# Patient Record
Sex: FEMALE | Race: WHITE | NOT HISPANIC OR LATINO | Employment: STUDENT | URBAN - METROPOLITAN AREA
[De-identification: names, ages, dates, MRNs, and addresses within clinical notes are randomized per-mention and may not be internally consistent; named-entity substitution may affect disease eponyms.]

---

## 2022-11-30 ENCOUNTER — TRANSCRIBE ORDERS (OUTPATIENT)
Dept: ADMINISTRATIVE | Facility: HOSPITAL | Age: 42
End: 2022-11-30

## 2022-11-30 DIAGNOSIS — Z12.31 VISIT FOR SCREENING MAMMOGRAM: Primary | ICD-10-CM

## 2023-01-17 ENCOUNTER — HOSPITAL ENCOUNTER (OUTPATIENT)
Dept: MAMMOGRAPHY | Facility: HOSPITAL | Age: 43
Discharge: HOME | End: 2023-01-17
Admitting: INTERNAL MEDICINE
Payer: OTHER GOVERNMENT

## 2023-01-17 DIAGNOSIS — Z12.31 VISIT FOR SCREENING MAMMOGRAM: ICD-10-CM

## 2023-01-17 PROCEDURE — 77063 BREAST TOMOSYNTHESIS BI: CPT

## 2023-01-17 PROCEDURE — 77067 SCR MAMMO BI INCL CAD: CPT

## 2023-01-17 PROCEDURE — 77067 SCR MAMMO BI INCL CAD: CPT | Performed by: RADIOLOGY

## 2023-01-17 PROCEDURE — 77063 BREAST TOMOSYNTHESIS BI: CPT | Performed by: RADIOLOGY

## 2023-02-21 ENCOUNTER — APPOINTMENT (OUTPATIENT)
Dept: MAMMOGRAPHY | Facility: HOSPITAL | Age: 43
End: 2023-02-21

## 2023-04-20 ENCOUNTER — TELEPHONE (OUTPATIENT)
Dept: URGENT CARE | Facility: CLINIC | Age: 43
End: 2023-04-20
Payer: OTHER GOVERNMENT

## 2024-06-07 ENCOUNTER — TRANSCRIBE ORDERS (OUTPATIENT)
Dept: ADMINISTRATIVE | Facility: HOSPITAL | Age: 44
End: 2024-06-07
Payer: OTHER GOVERNMENT

## 2024-06-07 DIAGNOSIS — Z12.39 ENCOUNTER FOR OTHER SCREENING FOR MALIGNANT NEOPLASM OF BREAST: Primary | ICD-10-CM

## 2024-09-14 PROBLEM — R05.9 COUGH: Status: ACTIVE | Noted: 2024-09-14

## 2024-11-25 LAB
NCCN CRITERIA FLAG: NORMAL
TYRER CUZICK SCORE: 15.1

## 2025-02-04 NOTE — PROGRESS NOTES
Patient: Cristin Garcia    YOB: 1980    Date: 02/07/2025    Primary Care Provider: Carolin Gardner APRN    Chief Complaint   Patient presents with    Colonoscopy       SUBJECTIVE:    History of present illness:  The patient is in the office today for evaluation and treatment of chronic diarrhea and screening colonoscopy. Patient states that she went to [facility] at the beginning of January and developed diarrhea. This has continued since then and she developed some bright red rectal bleeding after bowel movements. Her PCP did order stool studies and there was no evidence of infectious colitis. Patient states that she started probiotics over the past few weeks and this seems to be helping. She states every day or every other day she will have an episode of completely liquid stool, then it will go back to normal. She reports having some lower abdominal cramping prior to diarrhea. No association with certain foods. She has never had issues like this in the past. She does note that she was on PO antibiotics in December for a bad upper respiratory infection and is unsure if this is related. Of note, patient does have a family history positive for colon cancer in her father diagnosed in his early 50s and grandfather who was metastatic at time of diagnosis. She has never had a colonoscopy in the past.     The following portions of the patient's history were reviewed and updated as appropriate: allergies, current medications, past family history, past medical history, past social history, past surgical history and problem list.      Review of Systems:  Constitutional:  Negative for chills, fever, and unexpected weight change.  HENT: Negative for trouble swallowing and voice change.  Eyes:  Negative for visual disturbance.  Respiratory:  Negative for apnea, cough, chest tightness, shortness of breath, and wheezing.  Cardiovascular:  Negative for chest pain, palpitations, and leg swelling.  Gastrointestinal:   Positive for diarrhea  Musculoskeletal:  Negative for back pain, gait problem, and joint swelling.  Skin:  Negative for color change, rash, and wound  Neurological:  Negative for dizziness, syncope, speech difficulty, weakness, numbness, and headaches.  Hematological:  Negative for adenopathy.  Does not bruise/bleed easily.  Psychiatric/Behavioral:  Negative for confusion.  The patient is not nervous/anxious.      Allergies:  Allergies   Allergen Reactions    Iodine Swelling       Medications:    Current Outpatient Medications:     atorvastatin (LIPITOR) 40 MG tablet, , Disp: , Rfl:     Botox 100 units reconstituted solution injection, , Disp: , Rfl:     cetirizine (zyrTEC) 10 MG tablet, Take 1 tablet by mouth Daily., Disp: , Rfl:     cholecalciferol (VITAMIN D3) 25 MCG (1000 UT) tablet, , Disp: , Rfl:     EPINEPHrine (EPIPEN) 0.3 MG/0.3ML solution auto-injector injection, , Disp: , Rfl:     hydrOXYzine (ATARAX) 10 MG tablet, Take  by mouth Daily. Pt unsure of mg, Disp: , Rfl:     methylphenidate (Concerta) 54 MG CR tablet, Take 1 tablet by mouth Every Morning, Disp: , Rfl:     montelukast (SINGULAIR) 10 MG tablet, Take 1 tablet by mouth Every Night., Disp: , Rfl:     PARAGARD INTRAUTERINE COPPER IU, by Intrauterine route., Disp: , Rfl:     valACYclovir (Valtrex) 500 MG tablet, Take 1 tablet by mouth Daily., Disp: , Rfl:     amoxicillin-clavulanate (AUGMENTIN) 875-125 MG per tablet, 1 po bid (Patient not taking: Reported on 2/7/2025), Disp: 20 tablet, Rfl: 0    brompheniramine-pseudoephedrine-DM 30-2-10 MG/5ML syrup, Take 10 mL by mouth 4 (Four) Times a Day As Needed for Allergies, Cough or Congestion. (Patient not taking: Reported on 2/7/2025), Disp: 473 mL, Rfl: 0    budesonide (RINOCORT AQUA) 32 MCG/ACT nasal spray, Administer 1 spray into the nostril(s) as directed by provider Daily., Disp: , Rfl:     predniSONE (DELTASONE) 20 MG tablet, 3 po daily for 3 days, 2 po daily for 3 days, 1 po daily for 3 days  "(Patient not taking: Reported on 2/7/2025), Disp: 18 tablet, Rfl: 0    pseudoephedrine (SUDAFED) 120 MG 12 hr tablet, Take 1 tablet by mouth Every 12 (Twelve) Hours. (Patient not taking: Reported on 2/7/2025), Disp: 20 tablet, Rfl: 0    History:  History reviewed. No pertinent past medical history.    Past Surgical History:   Procedure Laterality Date    HIP SURGERY      x 4       Family History   Problem Relation Age of Onset    Cancer Father 50        colon    Cancer Paternal Grandfather     Breast cancer Neg Hx     Ovarian cancer Neg Hx        Social History     Tobacco Use    Smoking status: Never    Smokeless tobacco: Never   Vaping Use    Vaping status: Every Day    Substances: Nicotine    Devices: Pre-filled or refillable cartridge   Substance Use Topics    Alcohol use: Yes     Comment: occ    Drug use: Never        OBJECTIVE:    Vital Signs:   Vitals:    02/07/25 1038   BP: 124/68   Pulse: 82   Resp: 18   Temp: 97.7 °F (36.5 °C)   TempSrc: Temporal   SpO2: 98%   Weight: 79.6 kg (175 lb 6.4 oz)   Height: 172.7 cm (68\")     Physical Exam:     General Appearance:    Alert, cooperative, in no acute distress   Head:    Normocephalic, without obvious abnormality, atraumatic   Eyes:            Lids and lashes normal, conjunctivae and sclerae normal, no   icterus, no pallor, corneas clear, PERRLA   Throat:   No oral lesions, no thrush, oral mucosa moist   Lungs:     Clear to auscultation,respirations regular, even and                  unlabored    Heart:    Regular rhythm and normal rate, normal S1 and S2, no            murmur, no gallop, no rub, no click   Abdomen:     Normal bowel sounds, no masses, no organomegaly, soft        non-tender, non-distended, no guarding   Extremities:   Moves all extremities well, no edema, no cyanosis, no             redness   Pulses:   Pulses palpable and equal bilaterally   Skin:   No bleeding, bruising or rash   Neurologic:   Cranial nerves 2 - 12 grossly intact, sensation intact, " DTR       present and equal bilaterally         Results Review:   I reviewed the patient's new clinical results.    Review of Systems was reviewed and confirmed as accurate as documented by the MA.    ASSESSMENT/PLAN:    1. Diarrhea, unspecified type    2. Family history of colon cancer in father    3. Rectal bleeding      Patient was referred to me for evaluation and treatment of diarrhea. No evidence of infection on stool studies. Patient does also have a family history significant for colon cancer and screening for her should start 10 years before the diagnosis of 1st degree relative which would be now. The procedure and risks including bleeding, infection, damage to surrounding structures, perforation, and need for additional procedures was described. She expresses understanding, wishes to proceed, and all of her questions were answered. I do recommend she start a food diary to see if there is any association with her diarrhea and certain foods such as dairy. Continue probiotics. Patient was given prep instructions today.     Electronically signed by Desi Silva DO  02/07/25

## 2025-02-07 ENCOUNTER — OFFICE VISIT (OUTPATIENT)
Dept: SURGERY | Facility: CLINIC | Age: 45
End: 2025-02-07
Payer: OTHER GOVERNMENT

## 2025-02-07 ENCOUNTER — HOSPITAL ENCOUNTER (OUTPATIENT)
Dept: MAMMOGRAPHY | Facility: HOSPITAL | Age: 45
Discharge: HOME | End: 2025-02-07
Admitting: NURSE PRACTITIONER
Payer: OTHER GOVERNMENT

## 2025-02-07 VITALS
OXYGEN SATURATION: 98 % | BODY MASS INDEX: 26.58 KG/M2 | WEIGHT: 175.4 LBS | HEART RATE: 82 BPM | SYSTOLIC BLOOD PRESSURE: 124 MMHG | RESPIRATION RATE: 18 BRPM | DIASTOLIC BLOOD PRESSURE: 68 MMHG | TEMPERATURE: 97.7 F | HEIGHT: 68 IN

## 2025-02-07 DIAGNOSIS — K62.5 RECTAL BLEEDING: ICD-10-CM

## 2025-02-07 DIAGNOSIS — R19.7 DIARRHEA, UNSPECIFIED TYPE: Primary | ICD-10-CM

## 2025-02-07 DIAGNOSIS — Z80.0 FAMILY HISTORY OF COLON CANCER IN FATHER: ICD-10-CM

## 2025-02-07 DIAGNOSIS — Z12.39 ENCOUNTER FOR OTHER SCREENING FOR MALIGNANT NEOPLASM OF BREAST: ICD-10-CM

## 2025-02-07 PROCEDURE — 77063 BREAST TOMOSYNTHESIS BI: CPT

## 2025-02-07 PROCEDURE — 77067 SCR MAMMO BI INCL CAD: CPT

## 2025-02-07 RX ORDER — POLYETHYLENE GLYCOL 3350 17 G/17G
238 POWDER, FOR SOLUTION ORAL DAILY
Qty: 238 G | Refills: 0 | Status: SHIPPED | OUTPATIENT
Start: 2025-02-07 | End: 2025-02-08

## 2025-02-07 RX ORDER — BISACODYL 5 MG/1
TABLET, DELAYED RELEASE ORAL
Qty: 4 TABLET | Refills: 0 | Status: SHIPPED | OUTPATIENT
Start: 2025-02-07

## 2025-02-20 ENCOUNTER — OUTSIDE FACILITY SERVICE (OUTPATIENT)
Dept: SURGERY | Facility: CLINIC | Age: 45
End: 2025-02-20
Payer: OTHER GOVERNMENT

## 2025-02-20 PROCEDURE — 45380 COLONOSCOPY AND BIOPSY: CPT | Performed by: STUDENT IN AN ORGANIZED HEALTH CARE EDUCATION/TRAINING PROGRAM

## 2025-02-21 RX ORDER — SULFAMETHOXAZOLE AND TRIMETHOPRIM 800; 160 MG/1; MG/1
1 TABLET ORAL 2 TIMES DAILY
Qty: 6 TABLET | Refills: 0 | Status: SHIPPED | OUTPATIENT
Start: 2025-02-21

## 2025-03-20 ENCOUNTER — TRANSCRIBE ORDERS (OUTPATIENT)
Dept: GENERAL RADIOLOGY | Facility: HOSPITAL | Age: 45
End: 2025-03-20
Payer: OTHER GOVERNMENT

## 2025-03-20 ENCOUNTER — HOSPITAL ENCOUNTER (OUTPATIENT)
Dept: GENERAL RADIOLOGY | Facility: HOSPITAL | Age: 45
Discharge: HOME | End: 2025-03-20
Admitting: ANESTHESIOLOGY
Payer: OTHER GOVERNMENT

## 2025-03-20 DIAGNOSIS — M54.50 LOW BACK PAIN, UNSPECIFIED BACK PAIN LATERALITY, UNSPECIFIED CHRONICITY, UNSPECIFIED WHETHER SCIATICA PRESENT: Primary | ICD-10-CM

## 2025-03-20 DIAGNOSIS — M54.6 THORACIC SPINE PAIN: ICD-10-CM

## 2025-03-20 DIAGNOSIS — M54.50 LOW BACK PAIN, UNSPECIFIED BACK PAIN LATERALITY, UNSPECIFIED CHRONICITY, UNSPECIFIED WHETHER SCIATICA PRESENT: ICD-10-CM

## 2025-03-20 PROCEDURE — 72070 X-RAY EXAM THORAC SPINE 2VWS: CPT

## 2025-03-20 PROCEDURE — 72100 X-RAY EXAM L-S SPINE 2/3 VWS: CPT
